# Patient Record
Sex: FEMALE | Race: WHITE | Employment: OTHER | URBAN - METROPOLITAN AREA
[De-identification: names, ages, dates, MRNs, and addresses within clinical notes are randomized per-mention and may not be internally consistent; named-entity substitution may affect disease eponyms.]

---

## 2021-05-25 ENCOUNTER — OFFICE VISIT (OUTPATIENT)
Dept: PRIMARY CARE CLINIC | Age: 66
End: 2021-05-25
Payer: COMMERCIAL

## 2021-05-25 VITALS
SYSTOLIC BLOOD PRESSURE: 125 MMHG | HEART RATE: 71 BPM | BODY MASS INDEX: 23.66 KG/M2 | OXYGEN SATURATION: 98 % | WEIGHT: 142 LBS | HEIGHT: 65 IN | DIASTOLIC BLOOD PRESSURE: 85 MMHG

## 2021-05-25 DIAGNOSIS — G89.29 CHRONIC BILATERAL LOW BACK PAIN WITHOUT SCIATICA: Primary | ICD-10-CM

## 2021-05-25 DIAGNOSIS — M54.50 CHRONIC BILATERAL LOW BACK PAIN WITHOUT SCIATICA: Primary | ICD-10-CM

## 2021-05-25 PROCEDURE — 99213 OFFICE O/P EST LOW 20 MIN: CPT | Performed by: FAMILY MEDICINE

## 2021-05-25 SDOH — ECONOMIC STABILITY: FOOD INSECURITY: WITHIN THE PAST 12 MONTHS, THE FOOD YOU BOUGHT JUST DIDN'T LAST AND YOU DIDN'T HAVE MONEY TO GET MORE.: NEVER TRUE

## 2021-05-25 SDOH — ECONOMIC STABILITY: FOOD INSECURITY: WITHIN THE PAST 12 MONTHS, YOU WORRIED THAT YOUR FOOD WOULD RUN OUT BEFORE YOU GOT MONEY TO BUY MORE.: NEVER TRUE

## 2021-05-25 ASSESSMENT — PATIENT HEALTH QUESTIONNAIRE - PHQ9
SUM OF ALL RESPONSES TO PHQ QUESTIONS 1-9: 0
2. FEELING DOWN, DEPRESSED OR HOPELESS: 0
1. LITTLE INTEREST OR PLEASURE IN DOING THINGS: 0
SUM OF ALL RESPONSES TO PHQ9 QUESTIONS 1 & 2: 0
SUM OF ALL RESPONSES TO PHQ QUESTIONS 1-9: 0

## 2021-05-25 ASSESSMENT — ENCOUNTER SYMPTOMS: BACK PAIN: 1

## 2021-06-18 ENCOUNTER — TELEPHONE (OUTPATIENT)
Dept: PRIMARY CARE CLINIC | Age: 66
End: 2021-06-18

## 2021-07-06 ENCOUNTER — TELEPHONE (OUTPATIENT)
Dept: PRIMARY CARE CLINIC | Age: 66
End: 2021-07-06

## 2021-12-30 LAB
FLU A ANTIGEN: NEGATIVE
FLU B ANTIGEN: NEGATIVE

## 2022-05-18 ENCOUNTER — TELEPHONE (OUTPATIENT)
Dept: PRIMARY CARE CLINIC | Age: 67
End: 2022-05-18

## 2022-05-18 NOTE — TELEPHONE ENCOUNTER
Called and gave information for [facility] walk- in as our walk in is closed today.  Patient needs to be seen, especially with past traumatic back injury history

## 2022-09-13 ENCOUNTER — OFFICE VISIT (OUTPATIENT)
Dept: PRIMARY CARE CLINIC | Age: 67
End: 2022-09-13
Payer: MEDICARE

## 2022-09-13 VITALS
BODY MASS INDEX: 23.26 KG/M2 | HEIGHT: 65 IN | WEIGHT: 139.6 LBS | DIASTOLIC BLOOD PRESSURE: 74 MMHG | SYSTOLIC BLOOD PRESSURE: 118 MMHG | HEART RATE: 72 BPM | OXYGEN SATURATION: 97 %

## 2022-09-13 DIAGNOSIS — G89.29 CHRONIC BILATERAL LOW BACK PAIN WITHOUT SCIATICA: Primary | ICD-10-CM

## 2022-09-13 DIAGNOSIS — Z12.11 SCREENING FOR COLON CANCER: ICD-10-CM

## 2022-09-13 DIAGNOSIS — Z78.0 POSTMENOPAUSAL: ICD-10-CM

## 2022-09-13 DIAGNOSIS — M48.56XS NONTRAUMATIC COMPRESSION FRACTURE OF L1 VERTEBRA, SEQUELA: ICD-10-CM

## 2022-09-13 DIAGNOSIS — M54.50 CHRONIC BILATERAL LOW BACK PAIN WITHOUT SCIATICA: Primary | ICD-10-CM

## 2022-09-13 DIAGNOSIS — Z12.31 ENCOUNTER FOR SCREENING MAMMOGRAM FOR MALIGNANT NEOPLASM OF BREAST: ICD-10-CM

## 2022-09-13 PROCEDURE — 4004F PT TOBACCO SCREEN RCVD TLK: CPT | Performed by: FAMILY MEDICINE

## 2022-09-13 PROCEDURE — G8427 DOCREV CUR MEDS BY ELIG CLIN: HCPCS | Performed by: FAMILY MEDICINE

## 2022-09-13 PROCEDURE — 99214 OFFICE O/P EST MOD 30 MIN: CPT | Performed by: FAMILY MEDICINE

## 2022-09-13 PROCEDURE — G8420 CALC BMI NORM PARAMETERS: HCPCS | Performed by: FAMILY MEDICINE

## 2022-09-13 PROCEDURE — 3017F COLORECTAL CA SCREEN DOC REV: CPT | Performed by: FAMILY MEDICINE

## 2022-09-13 RX ORDER — SELENIUM 50 MCG
1 TABLET ORAL DAILY
COMMUNITY

## 2022-09-13 SDOH — ECONOMIC STABILITY: FOOD INSECURITY: WITHIN THE PAST 12 MONTHS, YOU WORRIED THAT YOUR FOOD WOULD RUN OUT BEFORE YOU GOT MONEY TO BUY MORE.: NEVER TRUE

## 2022-09-13 SDOH — ECONOMIC STABILITY: FOOD INSECURITY: WITHIN THE PAST 12 MONTHS, THE FOOD YOU BOUGHT JUST DIDN'T LAST AND YOU DIDN'T HAVE MONEY TO GET MORE.: NEVER TRUE

## 2022-09-13 ASSESSMENT — PATIENT HEALTH QUESTIONNAIRE - PHQ9
SUM OF ALL RESPONSES TO PHQ QUESTIONS 1-9: 0
SUM OF ALL RESPONSES TO PHQ9 QUESTIONS 1 & 2: 0
2. FEELING DOWN, DEPRESSED OR HOPELESS: 0
SUM OF ALL RESPONSES TO PHQ QUESTIONS 1-9: 0
1. LITTLE INTEREST OR PLEASURE IN DOING THINGS: 0

## 2022-09-13 ASSESSMENT — ENCOUNTER SYMPTOMS
COUGH: 0
SHORTNESS OF BREATH: 0
SORE THROAT: 0
ABDOMINAL PAIN: 0
CHEST TIGHTNESS: 0
BACK PAIN: 1
DIARRHEA: 0
CONSTIPATION: 0

## 2022-09-13 ASSESSMENT — SOCIAL DETERMINANTS OF HEALTH (SDOH): HOW HARD IS IT FOR YOU TO PAY FOR THE VERY BASICS LIKE FOOD, HOUSING, MEDICAL CARE, AND HEATING?: NOT HARD AT ALL

## 2022-09-13 NOTE — PROGRESS NOTES
Subjective:     Patient ID: Óscar Dhillon is a 61 y.o. female  Patient presents to get established in the practice with me today formally saw Dr. Sandeep Conley. Her primary complaint is back pain since 2017 when she had a traumatic injury with a closed pelvic ring fracture and a fracture of the left inferior pubic ramus. She was managed by Dr. Gaby Dubose. See italicized note below    Back Pain  Pertinent negatives include no abdominal pain, chest pain, dysuria, fever or headaches. Past Medical History   Diagnosis Date    Closed pelvic ring fracture ([address] 8/28/2017    Fracture of left inferior pubic ramus ([address] 8/29/2017    Tobacco abuse 8/29/2017     Past Surgical History   Procedure Laterality Date    Dilation and curettage of uterus   miscarriage in the 1980's       MRI / IMAGES: She has compression fracture identified on STIR images that showed there is an acuity to the L1 vertebral body. Also some acuity of injury in the sacrum. She also has degenerative disc disease at L4-5 which predated this injury. DIAGNOSIS/PLAN: Without much in the way of symptoms at this time I would let her start coming out of her brace and seeing if she is having more pain. If really the pain is subsiding with time I do not think she is going to need anything done from a surgical perspective. She will let us know. Electronically signed by:    Azalia Nicholson. Gaby Dubose MD, FACS           She also gives a family history renal cell cancer brain cancer does not smoke or drink. She does not exercises much as she can and tries to watch her nutrition        Review of Systems   Constitutional:  Negative for activity change, appetite change, fatigue and fever. HENT:  Negative for sore throat. Eyes:  Negative for visual disturbance. Respiratory:  Negative for cough, chest tightness and shortness of breath. Cardiovascular:  Negative for chest pain, palpitations and leg swelling.    Gastrointestinal:  Negative for abdominal pain, constipation and establish care and back pain. Diagnoses and all orders for this visit:    Chronic bilateral low back pain without sciatica  -     DEXA Bone Density Axial Skeleton; Future  -     CBC with Auto Differential; Future  -     Basic Metabolic Panel; Future  -     Lipid Panel; Future  -     Urinalysis; Future  -     Sedimentation Rate; Future    Nontraumatic compression fracture of L1 vertebra, sequela  -     DEXA Bone Density Axial Skeleton; Future  -     CBC with Auto Differential; Future  -     Basic Metabolic Panel; Future  -     Lipid Panel; Future  -     Urinalysis; Future  -     Sedimentation Rate; Future    Postmenopausal  -     DEXA Bone Density Axial Skeleton; Future  -     CBC with Auto Differential; Future  -     Basic Metabolic Panel; Future  -     Lipid Panel; Future  -     Urinalysis; Future  -     Sedimentation Rate; Future    Screening for colon cancer  -     Fecal DNA Colorectal cancer screening (Cologuard)  -     CBC with Auto Differential; Future  -     Basic Metabolic Panel; Future  -     Lipid Panel; Future  -     Urinalysis; Future  -     Sedimentation Rate; Future    Encounter for screening mammogram for malignant neoplasm of breast  -     CBC with Auto Differential; Future  -     Basic Metabolic Panel; Future  -     Lipid Panel; Future  -     Urinalysis; Future  -     Sedimentation Rate; Future      We discussed the anti-inflammatory diet and low glycemic index. We also discussed carbohydrate to fiber ratio no higher than 10-1. We discussed no naked carbohydrates. Discussed the headspace meditation alma. Discussed 8 fit alma for HIIT. Discussed 150 minutes of moderate exercise weekly  Reinforced need for quality sleep  Specific for the insomnia add magnesium 400 mg 2 hours before bedtime, OLLI an  hour before bedtime, DHA fish oil 1 g in the morning.     Simple 7 goals of lifestyle modification reviewed with patient  Handout with explanations given and reviewed  Questions answered  Patient verbalized understanding       Lisa Cintron MD    Please note that this chart was generated using voice recognition Dragon dictation software. Although every effort was made to ensure the accuracy of this automated transcription, some errors in transcription may have occurred.

## 2022-09-30 LAB — NONINV COLON CA DNA+OCC BLD SCRN STL QL: NEGATIVE

## 2023-02-06 ENCOUNTER — COMMUNITY OUTREACH (OUTPATIENT)
Dept: PRIMARY CARE CLINIC | Age: 68
End: 2023-02-06

## 2024-08-22 ENCOUNTER — OFFICE VISIT (OUTPATIENT)
Dept: PRIMARY CARE CLINIC | Age: 69
End: 2024-08-22
Payer: MEDICARE

## 2024-08-22 VITALS
WEIGHT: 130 LBS | DIASTOLIC BLOOD PRESSURE: 88 MMHG | BODY MASS INDEX: 21.66 KG/M2 | HEIGHT: 65 IN | OXYGEN SATURATION: 97 % | HEART RATE: 71 BPM | SYSTOLIC BLOOD PRESSURE: 138 MMHG

## 2024-08-22 DIAGNOSIS — Z13.220 ENCOUNTER FOR SCREENING FOR LIPID DISORDER: ICD-10-CM

## 2024-08-22 DIAGNOSIS — Z13.820 ENCOUNTER FOR SCREENING FOR OSTEOPOROSIS: ICD-10-CM

## 2024-08-22 DIAGNOSIS — Z76.89 ENCOUNTER TO ESTABLISH CARE: Primary | ICD-10-CM

## 2024-08-22 DIAGNOSIS — Z13.1 ENCOUNTER FOR SCREENING FOR DIABETES MELLITUS: ICD-10-CM

## 2024-08-22 DIAGNOSIS — Z12.2 ENCOUNTER FOR SCREENING FOR MALIGNANT NEOPLASM OF LUNG: ICD-10-CM

## 2024-08-22 DIAGNOSIS — Z12.31 ENCOUNTER FOR SCREENING MAMMOGRAM FOR MALIGNANT NEOPLASM OF BREAST: ICD-10-CM

## 2024-08-22 PROBLEM — S22.089A T11 VERTEBRAL FRACTURE (HCC): Status: RESOLVED | Noted: 2017-07-25 | Resolved: 2024-08-22

## 2024-08-22 PROBLEM — S70.12XA HEMATOMA OF LEFT THIGH: Status: RESOLVED | Noted: 2017-08-29 | Resolved: 2024-08-22

## 2024-08-22 PROBLEM — S32.592A FRACTURE OF LEFT INFERIOR PUBIC RAMUS (HCC): Status: RESOLVED | Noted: 2017-08-29 | Resolved: 2024-08-22

## 2024-08-22 PROCEDURE — G8420 CALC BMI NORM PARAMETERS: HCPCS | Performed by: STUDENT IN AN ORGANIZED HEALTH CARE EDUCATION/TRAINING PROGRAM

## 2024-08-22 PROCEDURE — 4004F PT TOBACCO SCREEN RCVD TLK: CPT | Performed by: STUDENT IN AN ORGANIZED HEALTH CARE EDUCATION/TRAINING PROGRAM

## 2024-08-22 PROCEDURE — G8427 DOCREV CUR MEDS BY ELIG CLIN: HCPCS | Performed by: STUDENT IN AN ORGANIZED HEALTH CARE EDUCATION/TRAINING PROGRAM

## 2024-08-22 PROCEDURE — 1123F ACP DISCUSS/DSCN MKR DOCD: CPT | Performed by: STUDENT IN AN ORGANIZED HEALTH CARE EDUCATION/TRAINING PROGRAM

## 2024-08-22 PROCEDURE — 99213 OFFICE O/P EST LOW 20 MIN: CPT | Performed by: STUDENT IN AN ORGANIZED HEALTH CARE EDUCATION/TRAINING PROGRAM

## 2024-08-22 PROCEDURE — 3017F COLORECTAL CA SCREEN DOC REV: CPT | Performed by: STUDENT IN AN ORGANIZED HEALTH CARE EDUCATION/TRAINING PROGRAM

## 2024-08-22 PROCEDURE — G8400 PT W/DXA NO RESULTS DOC: HCPCS | Performed by: STUDENT IN AN ORGANIZED HEALTH CARE EDUCATION/TRAINING PROGRAM

## 2024-08-22 PROCEDURE — 1090F PRES/ABSN URINE INCON ASSESS: CPT | Performed by: STUDENT IN AN ORGANIZED HEALTH CARE EDUCATION/TRAINING PROGRAM

## 2024-08-22 SDOH — ECONOMIC STABILITY: FOOD INSECURITY: WITHIN THE PAST 12 MONTHS, THE FOOD YOU BOUGHT JUST DIDN'T LAST AND YOU DIDN'T HAVE MONEY TO GET MORE.: NEVER TRUE

## 2024-08-22 SDOH — ECONOMIC STABILITY: FOOD INSECURITY: WITHIN THE PAST 12 MONTHS, YOU WORRIED THAT YOUR FOOD WOULD RUN OUT BEFORE YOU GOT MONEY TO BUY MORE.: NEVER TRUE

## 2024-08-22 SDOH — ECONOMIC STABILITY: INCOME INSECURITY: HOW HARD IS IT FOR YOU TO PAY FOR THE VERY BASICS LIKE FOOD, HOUSING, MEDICAL CARE, AND HEATING?: NOT HARD AT ALL

## 2024-08-22 ASSESSMENT — PATIENT HEALTH QUESTIONNAIRE - PHQ9
1. LITTLE INTEREST OR PLEASURE IN DOING THINGS: NOT AT ALL
SUM OF ALL RESPONSES TO PHQ9 QUESTIONS 1 & 2: 0
SUM OF ALL RESPONSES TO PHQ QUESTIONS 1-9: 0
2. FEELING DOWN, DEPRESSED OR HOPELESS: NOT AT ALL
SUM OF ALL RESPONSES TO PHQ QUESTIONS 1-9: 0

## 2024-09-16 ASSESSMENT — ENCOUNTER SYMPTOMS
BLOOD IN STOOL: 0
COUGH: 0
DIARRHEA: 0
CONSTIPATION: 0
WHEEZING: 0
ABDOMINAL PAIN: 0
NAUSEA: 0
SHORTNESS OF BREATH: 0
VOMITING: 0

## 2024-09-26 ENCOUNTER — LAB (OUTPATIENT)
Dept: PRIMARY CARE CLINIC | Age: 69
End: 2024-09-26

## 2024-09-26 VITALS — WEIGHT: 130 LBS | HEIGHT: 65 IN | BODY MASS INDEX: 21.66 KG/M2

## 2024-09-26 DIAGNOSIS — Z23 NEED FOR INFLUENZA VACCINATION: Primary | ICD-10-CM

## 2025-06-13 ENCOUNTER — TELEPHONE (OUTPATIENT)
Dept: PRIMARY CARE CLINIC | Age: 70
End: 2025-06-13

## 2025-06-23 ENCOUNTER — TELEPHONE (OUTPATIENT)
Dept: PRIMARY CARE CLINIC | Age: 70
End: 2025-06-23

## 2025-07-29 ENCOUNTER — TELEPHONE (OUTPATIENT)
Dept: PRIMARY CARE CLINIC | Age: 70
End: 2025-07-29

## 2025-08-04 ENCOUNTER — TELEPHONE (OUTPATIENT)
Dept: FAMILY MEDICINE CLINIC | Age: 70
End: 2025-08-04